# Patient Record
Sex: FEMALE | Race: WHITE | URBAN - METROPOLITAN AREA
[De-identification: names, ages, dates, MRNs, and addresses within clinical notes are randomized per-mention and may not be internally consistent; named-entity substitution may affect disease eponyms.]

---

## 2017-01-27 DIAGNOSIS — L70.0 ACNE VULGARIS: Primary | ICD-10-CM

## 2017-01-27 RX ORDER — SPIRONOLACTONE 50 MG/1
50 TABLET, FILM COATED ORAL 2 TIMES DAILY
Qty: 60 TABLET | Refills: 2 | Status: SHIPPED | OUTPATIENT
Start: 2017-01-27 | End: 2017-02-08

## 2017-01-27 NOTE — TELEPHONE ENCOUNTER
Chart reviewed. Approved for 3-month refill of spironolactone for acne vulgaris. Patient should have follow-up appointment scheduled for additional refills as has not been seen since May 2016 and had planned 3-month follow-up.     Nima Bowman MD   PGY-2 Dermatology Resident  Pager [phone]

## 2017-01-27 NOTE — TELEPHONE ENCOUNTER
Last Visit:05/02/16   Next Visit: None     1. Acne vulgaris, hormonal type. Blood pressure stable on spironolactone:    Continue spironolactone 50 mg. Take one pill by mouth twice daily (OK to take both at once).    Continue tretinoin 0.025% cream. Apply thinly at bedtime after washing face with gentle cleanser and applying moisturizer.      Follow-up in 3 months.

## 2017-02-08 ENCOUNTER — OFFICE VISIT (OUTPATIENT)
Dept: DERMATOLOGY | Facility: CLINIC | Age: 27
End: 2017-02-08

## 2017-02-08 VITALS — DIASTOLIC BLOOD PRESSURE: 82 MMHG | SYSTOLIC BLOOD PRESSURE: 127 MMHG

## 2017-02-08 DIAGNOSIS — L70.0 ACNE VULGARIS: ICD-10-CM

## 2017-02-08 DIAGNOSIS — L70.0 ACNE VULGARIS: Primary | ICD-10-CM

## 2017-02-08 LAB
CREAT SERPL-MCNC: 0.74 MG/DL (ref 0.52–1.04)
GFR SERPL CREATININE-BSD FRML MDRD: NORMAL ML/MIN/1.7M2
POTASSIUM SERPL-SCNC: 4.1 MMOL/L (ref 3.4–5.3)

## 2017-02-08 RX ORDER — CLINDAMYCIN AND BENZOYL PEROXIDE 10; 50 MG/G; MG/G
GEL TOPICAL DAILY
Qty: 50 G | Refills: 11 | Status: SHIPPED | OUTPATIENT
Start: 2017-02-08

## 2017-02-08 RX ORDER — SPIRONOLACTONE 50 MG/1
TABLET, FILM COATED ORAL
Qty: 90 TABLET | Refills: 5 | Status: SHIPPED | OUTPATIENT
Start: 2017-02-08 | End: 2017-04-26

## 2017-02-08 RX ORDER — TRETINOIN 0.25 MG/G
CREAM TOPICAL
Qty: 45 G | Refills: 6 | Status: SHIPPED | OUTPATIENT
Start: 2017-02-08

## 2017-02-08 ASSESSMENT — PAIN SCALES - GENERAL: PAINLEVEL: NO PAIN (0)

## 2017-02-08 NOTE — MR AVS SNAPSHOT
After Visit Summary   2/8/2017    Sara Disla    MRN: [id number]           Patient Information     Date Of Birth          1990        Visit Information        Provider Department      2/8/2017 2:00 PM Bell Agee PA-C [facility] D[facility]at[facility]        Today's Diagnoses     Acne vulgaris    -  1        Follow-ups after your visit        Your next 10 appointments already scheduled     Feb 08, 2017  2:30 PM   LAB with  LAB   [facility] ([facility])    [address] [place] [id number] [phone]           Patient must bring picture ID.  Patient should be prepared to give a urine specimen  Please do not eat 10-12 hours before your appointment if you are coming in fasting for labs on lipids, cholesterol, or glucose (sugar).  Pregnant women should follow their Care Team instructions. Water with medications is okay. Do not drink coffee or other fluids.   If you have concerns about taking  your medications, please ask at office or if scheduling via [employer], send a message by clicking on Secure Messaging, Message Your Care Team.              Future tests that were ordered for you today     Open Future Orders        Priority Expected Expires Ordered    Potassium Routine  2/8/2018 2/8/2017    Creatinine Routine  2/8/2018 2/8/2017            Who to contact     Please call your clinic at [phone] to:    Ask questions about your health    Make or cancel appointments    Discuss your medicines    Learn about your test results    Speak to your doctor   If you have compliments or concerns about an experience at your clinic, or if you wish to file a complaint, please contact [facility] Patient Relations at [phone] or email us at [email or web address]         Additional Information About Your Visit        [employer] Information     [employer] is an electronic gateway that provides easy, online access to your  medical records. With [employer], you can request a clinic appointment, read your test results, renew a prescription or communicate with your care team.     To sign up for [employer] visit the website at [email or web address]   You will be asked to enter the access code listed below, as well as some personal information. Please follow the directions to create your username and password.     Your access code is: [email or web address]  Expires: 2017  6:30 AM     Your access code will  in 90 days. If you need help or a new code, please contact your [facility] or call [phone] for assistance.        Care EveryWhere ID     This is your Care EveryWhere ID. This could be used by other organizations to access your [facility] medical records  [id number]         Blood Pressure from Last 3 Encounters:   17 127/82   16 120/82   11/05/15 116/77    Weight from Last 3 Encounters:   No data found for Wt                 Today's Medication Changes          These changes are accurate as of: 17  2:17 PM.  If you have any questions, ask your nurse or doctor.               Start taking these medicines.        Dose/Directions    clindamycin-benzoyl peroxide gel   Commonly known as:  BENZACLIN   Used for:  Acne vulgaris   Started by:  Bell Agee PA-C        Apply topically daily   Quantity:  50 g   Refills:  11            Where to get your medicines      These medications were sent to [facility] - [place], [place] - [address]  [address], [place] [address]     Phone:  [phone]    - clindamycin-benzoyl peroxide gel  - tretinoin 0.025 % cream             Primary Care Provider Office Phone # Fax #    Minda Gonzalez [phone]       [address] [address]        Thank you!     Thank you for choosing [facility] DERMATOLOG[facility]  for your care. Our goal is always to provide you with excellent care.  Hearing back from our patients is one way we can continue to improve our services. Please take a few minutes to complete the written survey that you may receive in the mail after your visit with us. Thank you!             Your Updated Medication List - Protect others around you: Learn how to safely use, store and throw away your medicines at www.disposemymeds.org.          This list is accurate as of: 2/8/17  2:17 PM.  Always use your most recent med list.                   Brand Name Dispense Instructions for use    clindamycin-benzoyl peroxide gel    BENZACLIN    50 g    Apply topically daily       ethynodiol-ethinyl estradiol 1-35 MG-MCG per tablet    KELNOR     Take 1 tablet by mouth daily       FLUoxetine 20 MG capsule    PROzac     Take 20 mg by mouth daily       spironolactone 50 MG tablet    ALDACTONE    60 tablet    Take 1 tablet (50 mg) by mouth 2 times daily       tretinoin 0.025 % cream    RETIN-A    45 g    Use every night

## 2017-02-08 NOTE — NURSING NOTE
Dermatology Rooming Note    Sara Disla's goals for this visit include:   Chief Complaint   Patient presents with     Derm Problem     Sara is here for an acne recheck, no concerns at this time     Cecilia Metzger LPN

## 2017-02-08 NOTE — PROGRESS NOTES
[facility] Dermatology Note      Dermatology Problem List:  1.  Acne vulgaris  -Increased spironolactone 150 mg dailu 2/8/17  -tretinoin 0.025% cream  -Sulfur wash not covered by insurance.   -Start benzaclin 2/8/17.    Encounter Date: Feb 8, 2017    CC:   Chief Complaint   Patient presents with     Derm Problem     Sara is here for an acne recheck, no concerns at this time         History of Present Illness:  This 26 year old female presents as a follow-up for acne vulgaris. The patient was last seen 05/02/2016 when she was continue on tretinoin cream and spironolactone 100 mg daily. The patient states she is very satisfied by her response to spironolactone; reports less breakouts, less oily skin. Urinary frequency tolerably increased. She opts to take her dose in the morning due to increased urinary frequency. She only has flares prior to her menses. She is happy over all with the improvement and would like to continue. Denies menstrual irregularities, breast tenderness, or excessive urination. The patient is oral contraception for contraception.  The patient is otherwise feeling well. There are no other skin concerns at this time.    Past Medical History:   Patient Active Problem List   Diagnosis     Acne vulgaris     No past medical history on file.  No past surgical history on file.    Social History:  The patient works as a  for [employer] and son. The patient admits to use of tanning beds.    Family History:  There is a family history of skin cancer in the patient's paternal grandmother, melanoma. Family hx of acne on dad's side.     Medications:  Current Outpatient Prescriptions   Medication Sig Dispense Refill     spironolactone (ALDACTONE) 50 MG tablet Take 1 tablet (50 mg) by mouth 2 times daily 60 tablet 2     tretinoin (RETIN-A) 0.025 % cream Use every night 45 g 6     FLUoxetine (PROZAC) 20 MG capsule Take 20 mg by mouth daily       ethynodiol-ethinyl estradiol (KELNOR) 1-35  MG-MCG per tablet Take 1 tablet by mouth daily          No Known Allergies      Review of Systems:  -As per HPI  -Constitutional: The patient is generally feeling well.    Physical exam:  /82 mmHg  -GEN: This is a well developed, well-nourished female in no acute distress, in a pleasant mood.    -SKIN: Focused examination of the face was performed.  -~3 inflammatory pustules to the forehead  -Intermixed open and closed comedones periorally. Cheeks are clear.   -No other lesions of concern on areas examined.     Impression/Plan:    1. Acne vulgaris, hormonal type. Blood pressure stable on spironolactone:    Increase spironolactone to 150 mg daily. Decrease if unable to tolerate side effects with increased dose.    Today, check Creatinine and Potassium. These were within normal limits.     Continue tretinoin 0.025% cream. Apply thinly at bedtime after washing face with gentle cleanser and applying moisturizer.    In the morning, start Benzaclin (clindamycin 1%-5% gel) daily.         Follow-up in 6 months.      Staff Involved:    All risks, benefits and alternatives were discussed with patient.  Patient is in agreement and understands the assessment and plan.  All questions were answered.  Sun Screen Education was given.   Return to Clinic in 6 months or sooner as needed.   Bell Agee PA-C

## 2017-02-08 NOTE — Clinical Note
2/8/2017       RE: Sara Disla  [address]   [facility] [address]     Dear Colleague,    Thank you for referring your patient, Sara Disla, to the [facility] DERMATOLOGY at [facility]. Please see a copy of my visit note below.    [facility] Dermatology Note      Dermatology Problem List:  1.  Acne vulgaris  -Increased spironolactone 150 mg dailu 2/8/17  -tretinoin 0.025% cream  -Sulfur wash not covered by insurance.   -Start benzaclin 2/8/17.    Encounter Date: Feb 8, 2017    CC:   Chief Complaint   Patient presents with     Derm Problem     Sara is here for an acne recheck, no concerns at this time         History of Present Illness:  This 26 year old female presents as a follow-up for acne vulgaris. The patient was last seen 05/02/2016 when she was continue on tretinoin cream and spironolactone 100 mg daily. The patient states she is very satisfied by her response to spironolactone; reports less breakouts, less oily skin. Urinary frequency tolerably increased. She opts to take her dose in the morning due to increased urinary frequency. She only has flares prior to her menses. She is happy over all with the improvement and would like to continue. Denies menstrual irregularities, breast tenderness, or excessive urination. The patient is oral contraception for contraception.  The patient is otherwise feeling well. There are no other skin concerns at this time.    Past Medical History:   Patient Active Problem List   Diagnosis     Acne vulgaris     No past medical history on file.  No past surgical history on file.    Social History:  The patient works as a  for [employer] and son. The patient admits to use of tanning beds.    Family History:  There is a family history of skin cancer in the patient's paternal grandmother, melanoma. Family hx of acne on dad's side.     Medications:  Current Outpatient Prescriptions   Medication Sig  Dispense Refill     spironolactone (ALDACTONE) 50 MG tablet Take 1 tablet (50 mg) by mouth 2 times daily 60 tablet 2     tretinoin (RETIN-A) 0.025 % cream Use every night 45 g 6     FLUoxetine (PROZAC) 20 MG capsule Take 20 mg by mouth daily       ethynodiol-ethinyl estradiol (KELNOR) 1-35 MG-MCG per tablet Take 1 tablet by mouth daily          No Known Allergies      Review of Systems:  -As per HPI  -Constitutional: The patient is generally feeling well.    Physical exam:  /82 mmHg  -GEN: This is a well developed, well-nourished female in no acute distress, in a pleasant mood.    -SKIN: Focused examination of the face was performed.  -~3 inflammatory pustules to the forehead  -Intermixed open and closed comedones periorally. Cheeks are clear.   -No other lesions of concern on areas examined.     Impression/Plan:    1. Acne vulgaris, hormonal type. Blood pressure stable on spironolactone:    Increase spironolactone to 150 mg daily. Decrease if unable to tolerate side effects with increased dose.    Today, check Creatinine and Potassium. These were within normal limits.     Continue tretinoin 0.025% cream. Apply thinly at bedtime after washing face with gentle cleanser and applying moisturizer.    In the morning, start Benzaclin (clindamycin 1%-5% gel) daily.         Follow-up in 6 months.      Staff Involved:    All risks, benefits and alternatives were discussed with patient.  Patient is in agreement and understands the assessment and plan.  All questions were answered.  Sun Screen Education was given.   Return to Clinic in 6 months or sooner as needed.   Bell Agee PA-C

## 2017-04-26 DIAGNOSIS — L70.0 ACNE VULGARIS: ICD-10-CM

## 2017-04-26 NOTE — TELEPHONE ENCOUNTER
Last visit 2-81-7  Recall 8-8-17      Impression/Plan:     1. Acne vulgaris, hormonal type. Blood pressure stable on spironolactone:    Increase spironolactone to 150 mg daily. Decrease if unable to tolerate side effects with increased dose.    Today, check Creatinine and Potassium. These were within normal limits.     Continue tretinoin 0.025% cream. Apply thinly at bedtime after washing face with gentle cleanser and applying moisturizer.    In the morning, start Benzaclin (clindamycin 1%-5% gel) daily.            Follow-up in 6 months.

## 2017-04-27 RX ORDER — SPIRONOLACTONE 50 MG/1
TABLET, FILM COATED ORAL
Qty: 90 TABLET | Refills: 5 | Status: SHIPPED | OUTPATIENT
Start: 2017-04-27 | End: 2017-11-14

## 2017-11-14 DIAGNOSIS — L70.0 ACNE VULGARIS: ICD-10-CM

## 2017-11-14 RX ORDER — SPIRONOLACTONE 50 MG/1
TABLET, FILM COATED ORAL
Qty: 90 TABLET | Refills: 0 | Status: SHIPPED | OUTPATIENT
Start: 2017-11-14

## 2017-11-14 NOTE — TELEPHONE ENCOUNTER
Last visit 2-8   I placed 3 M recall today      Impression/Plan:     1. Acne vulgaris, hormonal type. Blood pressure stable on spironolactone:    Increase spironolactone to 150 mg daily. Decrease if unable to tolerate side effects with increased dose.    Today, check Creatinine and Potassium. These were within normal limits.     Continue tretinoin 0.025% cream. Apply thinly at bedtime after washing face with gentle cleanser and applying moisturizer.    In the morning, start Benzaclin (clindamycin 1%-5% gel) daily.            Follow-up in 6 months.